# Patient Record
Sex: MALE | Race: WHITE
[De-identification: names, ages, dates, MRNs, and addresses within clinical notes are randomized per-mention and may not be internally consistent; named-entity substitution may affect disease eponyms.]

---

## 2020-02-03 ENCOUNTER — HOSPITAL ENCOUNTER (OUTPATIENT)
Dept: HOSPITAL 47 - EC | Age: 50
Setting detail: OBSERVATION
LOS: 3 days | Discharge: HOME HEALTH | End: 2020-02-06
Attending: HOSPITALIST | Admitting: HOSPITALIST
Payer: COMMERCIAL

## 2020-02-03 DIAGNOSIS — F32.9: ICD-10-CM

## 2020-02-03 DIAGNOSIS — Z71.6: ICD-10-CM

## 2020-02-03 DIAGNOSIS — E78.5: ICD-10-CM

## 2020-02-03 DIAGNOSIS — G89.29: ICD-10-CM

## 2020-02-03 DIAGNOSIS — F17.210: ICD-10-CM

## 2020-02-03 DIAGNOSIS — Z88.0: ICD-10-CM

## 2020-02-03 DIAGNOSIS — B95.61: ICD-10-CM

## 2020-02-03 DIAGNOSIS — Z79.899: ICD-10-CM

## 2020-02-03 DIAGNOSIS — F51.04: ICD-10-CM

## 2020-02-03 DIAGNOSIS — M00.072: Primary | ICD-10-CM
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
## 2020-02-03 DIAGNOSIS — M06.9: ICD-10-CM

## 2020-02-03 DIAGNOSIS — E66.9: ICD-10-CM

## 2020-02-03 LAB
ALBUMIN SERPL-MCNC: 3.9 G/DL (ref 3.5–5)
ALP SERPL-CCNC: 59 U/L (ref 38–126)
ALT SERPL-CCNC: 23 U/L (ref 4–49)
ANION GAP SERPL CALC-SCNC: 4 MMOL/L
APTT BLD: 20.3 SEC (ref 22–30)
AST SERPL-CCNC: 24 U/L (ref 17–59)
BASOPHILS # BLD AUTO: 0.1 K/UL (ref 0–0.2)
BASOPHILS NFR BLD AUTO: 1 %
BUN SERPL-SCNC: 13 MG/DL (ref 9–20)
CALCIUM SPEC-MCNC: 9.7 MG/DL (ref 8.4–10.2)
CHLORIDE SERPL-SCNC: 105 MMOL/L (ref 98–107)
CO2 SERPL-SCNC: 31 MMOL/L (ref 22–30)
EOSINOPHIL # BLD AUTO: 0.1 K/UL (ref 0–0.7)
EOSINOPHIL NFR BLD AUTO: 1 %
ERYTHROCYTE [DISTWIDTH] IN BLOOD BY AUTOMATED COUNT: 4.05 M/UL (ref 4.3–5.9)
ERYTHROCYTE [DISTWIDTH] IN BLOOD: 13.2 % (ref 11.5–15.5)
GLUCOSE SERPL-MCNC: 93 MG/DL (ref 74–99)
HCT VFR BLD AUTO: 38.6 % (ref 39–53)
HGB BLD-MCNC: 12.4 GM/DL (ref 13–17.5)
INR PPP: 0.9 (ref ?–1.2)
LYMPHOCYTES # SPEC AUTO: 0.8 K/UL (ref 1–4.8)
LYMPHOCYTES NFR SPEC AUTO: 10 %
MCH RBC QN AUTO: 30.7 PG (ref 25–35)
MCHC RBC AUTO-ENTMCNC: 32.2 G/DL (ref 31–37)
MCV RBC AUTO: 95.3 FL (ref 80–100)
MONOCYTES # BLD AUTO: 0.5 K/UL (ref 0–1)
MONOCYTES NFR BLD AUTO: 6 %
NEUTROPHILS # BLD AUTO: 6.7 K/UL (ref 1.3–7.7)
NEUTROPHILS NFR BLD AUTO: 81 %
PLATELET # BLD AUTO: 396 K/UL (ref 150–450)
POTASSIUM SERPL-SCNC: 4.5 MMOL/L (ref 3.5–5.1)
PROT SERPL-MCNC: 6.9 G/DL (ref 6.3–8.2)
PT BLD: 9.7 SEC (ref 9–12)
SODIUM SERPL-SCNC: 140 MMOL/L (ref 137–145)
WBC # BLD AUTO: 8.2 K/UL (ref 3.8–10.6)

## 2020-02-03 PROCEDURE — 96367 TX/PROPH/DG ADDL SEQ IV INF: CPT

## 2020-02-03 PROCEDURE — 96365 THER/PROPH/DIAG IV INF INIT: CPT

## 2020-02-03 PROCEDURE — 80048 BASIC METABOLIC PNL TOTAL CA: CPT

## 2020-02-03 PROCEDURE — 36415 COLL VENOUS BLD VENIPUNCTURE: CPT

## 2020-02-03 PROCEDURE — 87040 BLOOD CULTURE FOR BACTERIA: CPT

## 2020-02-03 PROCEDURE — 85652 RBC SED RATE AUTOMATED: CPT

## 2020-02-03 PROCEDURE — 85730 THROMBOPLASTIN TIME PARTIAL: CPT

## 2020-02-03 PROCEDURE — 73610 X-RAY EXAM OF ANKLE: CPT

## 2020-02-03 PROCEDURE — 96374 THER/PROPH/DIAG INJ IV PUSH: CPT

## 2020-02-03 PROCEDURE — 96376 TX/PRO/DX INJ SAME DRUG ADON: CPT

## 2020-02-03 PROCEDURE — 86140 C-REACTIVE PROTEIN: CPT

## 2020-02-03 PROCEDURE — 85610 PROTHROMBIN TIME: CPT

## 2020-02-03 PROCEDURE — 96366 THER/PROPH/DIAG IV INF ADDON: CPT

## 2020-02-03 PROCEDURE — 84145 PROCALCITONIN (PCT): CPT

## 2020-02-03 PROCEDURE — 99284 EMERGENCY DEPT VISIT MOD MDM: CPT

## 2020-02-03 PROCEDURE — 96375 TX/PRO/DX INJ NEW DRUG ADDON: CPT

## 2020-02-03 PROCEDURE — 36573 INSJ PICC RS&I 5 YR+: CPT

## 2020-02-03 PROCEDURE — 96372 THER/PROPH/DIAG INJ SC/IM: CPT

## 2020-02-03 PROCEDURE — 80053 COMPREHEN METABOLIC PANEL: CPT

## 2020-02-03 PROCEDURE — 85025 COMPLETE CBC W/AUTO DIFF WBC: CPT

## 2020-02-03 PROCEDURE — 96361 HYDRATE IV INFUSION ADD-ON: CPT

## 2020-02-03 PROCEDURE — 83605 ASSAY OF LACTIC ACID: CPT

## 2020-02-03 RX ADMIN — MORPHINE SULFATE PRN MG: 4 INJECTION, SOLUTION INTRAMUSCULAR; INTRAVENOUS at 19:30

## 2020-02-03 RX ADMIN — CEFAZOLIN SCH MLS/HR: 330 INJECTION, POWDER, FOR SOLUTION INTRAMUSCULAR; INTRAVENOUS at 14:53

## 2020-02-03 RX ADMIN — CEFAZOLIN SCH MLS/HR: 330 INJECTION, POWDER, FOR SOLUTION INTRAMUSCULAR; INTRAVENOUS at 21:28

## 2020-02-03 NOTE — ED
General Adult HPI





- General


Chief complaint: Extremity Injury, Lower


Stated complaint: bacterial infection rt ankle


Time Seen by Provider: 02/03/20 13:55


Source: patient, RN notes reviewed


Mode of arrival: ambulatory


Limitations: no limitations





- History of Present Illness


Initial comments: 





Patient is a pleasant 49-year-old male presenting to the emergency Department 

with right ankle pain and swelling.  Patient has had symptoms intermittently 

over the past couple of months.  Patient did see a rheumatologist and had 

aspiration done.  Patient was called today and told that there was infection and

to come to the emergency department.  Patient states discomfort is moderate at 

this time.  Patient denies any fevers.  Patient denies any redness.  No other 

area of involvement.





- Related Data


                                    Allergies











Allergy/AdvReac Type Severity Reaction Status Date / Time


 


Penicillins Allergy  Unknown Verified 02/03/20 13:26





   Childhood  














Review of Systems


ROS Statement: 


Those systems with pertinent positive or pertinent negative responses have been 

documented in the HPI.





ROS Other: All systems not noted in ROS Statement are negative.


Constitutional: Denies: fever


Eyes: Denies: eye pain


ENT: Denies: ear pain


Respiratory: Denies: cough


Cardiovascular: Denies: chest pain


Endocrine: Denies: fatigue


Gastrointestinal: Denies: abdominal pain


Genitourinary: Denies: dysuria


Musculoskeletal: Reports: as per HPI, arthralgia.  Denies: back pain


Skin: Reports: as per HPI





Past Medical History


Past Medical History: Rheumatoid Arthritis (RA)


History of Any Multi-Drug Resistant Organisms: None Reported


Past Surgical History: No Surgical Hx Reported


Past Psychological History: No Psychological Hx Reported


Smoking Status: Current every day smoker


Past Alcohol Use History: None Reported


Past Drug Use History: None Reported





General Exam


Limitations: no limitations


General appearance: alert, in no apparent distress


Head exam: Present: normocephalic


Eye exam: Present: normal appearance, PERRL


ENT exam: Present: normal oropharynx


Neck exam: Present: normal inspection


Respiratory exam: Present: normal lung sounds bilaterally


Cardiovascular Exam: Present: regular rate, normal rhythm


Extremities exam: Present: other (Right ankle with moderate swelling extending 

somewhat to the lateral foot.  There is also mild to moderate tenderness and 

mild warmth.  No erythema.)


Neurological exam: Present: alert.  Absent: motor sensory deficit


Skin exam: Present: normal color.  Absent: rash, erythema





Course





                                   Vital Signs











  02/03/20





  13:26


 


Temperature 98.5 F


 


Pulse Rate 81


 


Respiratory 18





Rate 


 


Blood Pressure 163/99


 


O2 Sat by Pulse 99





Oximetry 














Medical Decision Making





- Medical Decision Making





Patient reevaluated and updated.  Case was discussed with Dr. Waddell, who will 

admit for Dr. Mackay.  IV antibiotics will be started based off culture results

for Staphylococcus aureus





- Lab Data


Result diagrams: 


                                 02/03/20 14:48





                                 02/03/20 14:48





                                   Lab Results











  02/03/20 02/03/20 02/03/20 Range/Units





  14:48 14:48 14:48 


 


WBC  8.2    (3.8-10.6)  k/uL


 


RBC  4.05 L    (4.30-5.90)  m/uL


 


Hgb  12.4 L    (13.0-17.5)  gm/dL


 


Hct  38.6 L    (39.0-53.0)  %


 


MCV  95.3    (80.0-100.0)  fL


 


MCH  30.7    (25.0-35.0)  pg


 


MCHC  32.2    (31.0-37.0)  g/dL


 


RDW  13.2    (11.5-15.5)  %


 


Plt Count  396    (150-450)  k/uL


 


Neutrophils %  81    %


 


Lymphocytes %  10    %


 


Monocytes %  6    %


 


Eosinophils %  1    %


 


Basophils %  1    %


 


Neutrophils #  6.7    (1.3-7.7)  k/uL


 


Lymphocytes #  0.8 L    (1.0-4.8)  k/uL


 


Monocytes #  0.5    (0-1.0)  k/uL


 


Eosinophils #  0.1    (0-0.7)  k/uL


 


Basophils #  0.1    (0-0.2)  k/uL


 


Sodium   140   (137-145)  mmol/L


 


Potassium   4.5   (3.5-5.1)  mmol/L


 


Chloride   105   ()  mmol/L


 


Carbon Dioxide   31 H   (22-30)  mmol/L


 


Anion Gap   4   mmol/L


 


BUN   13   (9-20)  mg/dL


 


Creatinine   0.81   (0.66-1.25)  mg/dL


 


Est GFR (CKD-EPI)AfAm   >90   (>60 ml/min/1.73 sqM)  


 


Est GFR (CKD-EPI)NonAf   >90   (>60 ml/min/1.73 sqM)  


 


Glucose   93   (74-99)  mg/dL


 


Plasma Lactic Acid Jaime    1.5  (0.7-2.0)  mmol/L


 


Calcium   9.7   (8.4-10.2)  mg/dL


 


Total Bilirubin   0.3   (0.2-1.3)  mg/dL


 


AST   24   (17-59)  U/L


 


ALT   23   (4-49)  U/L


 


Alkaline Phosphatase   59   ()  U/L


 


Total Protein   6.9   (6.3-8.2)  g/dL


 


Albumin   3.9   (3.5-5.0)  g/dL














Disposition


Clinical Impression: 


 Septic arthritis





Disposition: ADMITTED AS IP TO THIS HOSP


Is patient prescribed a controlled substance at d/c from ED?: No


Referrals: 


Remington Mitchell DO [Primary Care Provider] - 1-2 days


Decision Time: 15:23

## 2020-02-03 NOTE — CONS
CONSULTATION



DATE OF SERVICE:

02/03/2020



REASON FOR CONSULTATION:

Right ankle septic arthritis.



HISTORY OF PRESENT ILLNESS:

The patient is a 49-year-old  male with a past medical history significant for

chronic pain to his right ankle area, for which the patient has been under care of his

rheumatologist.  The patient did have a history of rheumatoid arthritis as well. The

ankle pain has been going on for the last few weeks, and previously he did have an

aspirate and injection.  The patient also had repeat aspiration and injection on

Friday.  Apparently the fluid was slightly cloudy this time.  The patient did receive a

call from his rheumatologist that he needed to go to the hospital, as the cultures came

back positive with Staph with concern for right ankle septic arthritis.  The patient

has been complaining of pain to the right ankle area, more dull aching to sharp, about

6 to 7 out of 10, and no radiation.  The ankle is slightly swollen but there is no

redness.  The patient denies having a fever.  No nausea, no vomiting.  No abdominal

pain. No diarrhea. On arrival in the ER, the patient has been afebrile.  The patient's

white count is normal at 8.2.  The patient did have blood cultures drawn.  He was

started on Rocephin and admitted to the hospital. Infectious Disease was consulted for

further recommendations regarding antibiotic therapy.



REVIEW OF SYSTEMS:

Positive points have been mentioned in HPI.  Rest of the systems are negative.



PAST MEDICAL HISTORY:

Rheumatoid arthritis.



PAST SURGICAL HISTORY:

No major surgery.



SOCIAL HISTORY:

Current everyday smoker.  No drinking or any drug use.



FAMILY HISTORY:

No pertinent findings noticed.



ALLERGIES:

PENICILLIN with a childhood reaction.



MEDICATIONS:

The patient is currently on Rocephin 1 gram q.24 hours. He is on Tylenol, morphine

sulfate, Narcan and Ultram.



PHYSICAL EXAMINATION:

Blood pressure is 137/96, pulse of 74, temperature 98.3. He is 92% on room air.

General description is a middle-aged male lying in bed in no distress.  No tachypnea or

accessory muscle of respiration use.

HEENT examination shows pallor. No scleral icterus.  Oral mucosa membrane is dry.  No

pharyngeal erythema or thrush.

NECK: Trachea is central. No thyromegaly.

LUNGS: Unlabored breathing. Clear to auscultation anteriorly.  No wheeze or crackle.

HEART: S1, S2.  Regular rate and rhythm.

ABDOMEN: Soft. No tenderness. No guarding or rigidity.

EXTREMITIES: No edema of the feet. Right knee slightly swollen and tender to touch.  No

significant redness was noticed.



LABS:

Hemoglobin is 12.4, white count 8.2, BUN of 13, creatinine 0.81.



DIAGNOSTIC IMPRESSION AND PLAN:

Patient with a positive culture from the right ankle area with Staphylococcus aureus

with concern for septic arthritis in this patient who did have pain to the right ankle

and has been injected in the past as well. Possible risk factor.



PLAN:

1. Blood cultures have been obtained.  Those will be followed to make sure patient is

    not bacteremic.

2. We will check a CRP and a sed rate and check an x-ray of the right ankle area.

3. Discontinue Rocephin and start the patient on cefazolin 2 grams q.8 hours.

4. We will follow up on his clinical condition and culture to further adjust

    medication if needed.

Thank you for this consultation.  Will follow this patient along with you.





MMSADIQ / MEJIAN: [id number] / Job#: [id number]

## 2020-02-04 LAB
ANION GAP SERPL CALC-SCNC: 4 MMOL/L
BASOPHILS # BLD AUTO: 0 K/UL (ref 0–0.2)
BASOPHILS NFR BLD AUTO: 1 %
BUN SERPL-SCNC: 13 MG/DL (ref 9–20)
CALCIUM SPEC-MCNC: 9 MG/DL (ref 8.4–10.2)
CHLORIDE SERPL-SCNC: 106 MMOL/L (ref 98–107)
CO2 SERPL-SCNC: 29 MMOL/L (ref 22–30)
EOSINOPHIL # BLD AUTO: 0.1 K/UL (ref 0–0.7)
EOSINOPHIL NFR BLD AUTO: 1 %
ERYTHROCYTE [DISTWIDTH] IN BLOOD BY AUTOMATED COUNT: 3.77 M/UL (ref 4.3–5.9)
ERYTHROCYTE [DISTWIDTH] IN BLOOD: 13.3 % (ref 11.5–15.5)
GLUCOSE SERPL-MCNC: 73 MG/DL (ref 74–99)
HCT VFR BLD AUTO: 36.6 % (ref 39–53)
HGB BLD-MCNC: 11.3 GM/DL (ref 13–17.5)
LYMPHOCYTES # SPEC AUTO: 1.2 K/UL (ref 1–4.8)
LYMPHOCYTES NFR SPEC AUTO: 20 %
MCH RBC QN AUTO: 30 PG (ref 25–35)
MCHC RBC AUTO-ENTMCNC: 30.8 G/DL (ref 31–37)
MCV RBC AUTO: 97.1 FL (ref 80–100)
MONOCYTES # BLD AUTO: 0.4 K/UL (ref 0–1)
MONOCYTES NFR BLD AUTO: 7 %
NEUTROPHILS # BLD AUTO: 4.1 K/UL (ref 1.3–7.7)
NEUTROPHILS NFR BLD AUTO: 70 %
PLATELET # BLD AUTO: 349 K/UL (ref 150–450)
POTASSIUM SERPL-SCNC: 4.7 MMOL/L (ref 3.5–5.1)
SODIUM SERPL-SCNC: 139 MMOL/L (ref 137–145)
WBC # BLD AUTO: 5.9 K/UL (ref 3.8–10.6)

## 2020-02-04 RX ADMIN — ESCITALOPRAM OXALATE SCH MG: 20 TABLET, FILM COATED ORAL at 11:30

## 2020-02-04 RX ADMIN — NICOTINE SCH PATCH: 14 PATCH, EXTENDED RELEASE TRANSDERMAL at 14:19

## 2020-02-04 RX ADMIN — ATORVASTATIN CALCIUM SCH MG: 10 TABLET, FILM COATED ORAL at 11:28

## 2020-02-04 RX ADMIN — MORPHINE SULFATE PRN MG: 4 INJECTION, SOLUTION INTRAMUSCULAR; INTRAVENOUS at 14:14

## 2020-02-04 RX ADMIN — MORPHINE SULFATE PRN MG: 4 INJECTION, SOLUTION INTRAMUSCULAR; INTRAVENOUS at 01:43

## 2020-02-04 RX ADMIN — MORPHINE SULFATE PRN MG: 4 INJECTION, SOLUTION INTRAMUSCULAR; INTRAVENOUS at 07:55

## 2020-02-04 RX ADMIN — CEFAZOLIN SCH MLS/HR: 330 INJECTION, POWDER, FOR SOLUTION INTRAMUSCULAR; INTRAVENOUS at 11:25

## 2020-02-04 RX ADMIN — CEFAZOLIN SCH MLS/HR: 330 INJECTION, POWDER, FOR SOLUTION INTRAMUSCULAR; INTRAVENOUS at 01:43

## 2020-02-04 RX ADMIN — MORPHINE SULFATE PRN MG: 4 INJECTION, SOLUTION INTRAMUSCULAR; INTRAVENOUS at 18:43

## 2020-02-04 RX ADMIN — ASPIRIN SCH: 325 TABLET ORAL at 12:52

## 2020-02-04 RX ADMIN — BUPROPION HYDROCHLORIDE SCH MG: 300 TABLET, FILM COATED, EXTENDED RELEASE ORAL at 11:30

## 2020-02-04 NOTE — P.HPIM
History of Present Illness


H&P Date: 02/04/20


Chief Complaint: Right ankle pain





History of presenting complaint:


This is a very pleasant 49-year-old patient of Remington BUCKLEYMerced.  Chronic stable 

medical conditions include hyperlipidemia, depression, insomnia.  Patient 

follows with Dr. Andrade-rheumatologist.  Has a diagnosis of rheumatoid 

arthritis.  That affects primarily his shoulders hands and ankles.  Patient's 

had trouble with the right ankle for some time.  And has received a total of at 

least 10 steroid injections for the same.  Including 2 this  year.  Patient had 

injury to the left leg with a brace and was relatively  not weightbearing.  

Patient had been putting weight on the right lower extremity and the right 

ankle.  Patient now again presents with increasing pain in the right ankle.  

Denies any fever chills nausea vomiting.  The joint was tapped by Dr. Andrade 

staph aureus was grown and patient was sent in for the same.  Initially started 

on IV ceftriaxone and then switched over to IV Ancef by Dr. Manzano from 

infectious disease.  Appetite is good otherwise no nausea vomiting.





Review of systems:


GEN.:  Tired


EYES: None


HEENT: None


NECK: None


RESPIRATORY: None


CARDIOVASCULAR: None


GASTROINTESTINAL: None


GENITOURINARY: None


MUSCULOSKELETAL: As above


LYMPHATICS: None


HEMATOLOGICAL: None  


PSYCHIATRY: None


NEUROLOGICAL: Trouble sleeping





Past medical history to include:


Hyperlipidemia, depression, insomnia, rheumatoid arthritis





Social history:


Smokes about a pack a day, lives with her son.  Does construction work.  Alcohol

occasionally.





Family history:


Reviewed, noncontributory to presentation





Physical examination:


VITAL SIGNS: 98.5, 81, 18, 163/99, 99% room air


GENERAL: BMI 33.5, sitting up in bed, not in distress.


EYES: Pupils equal.  Conjunctiva normal.


HEENT: External appearance of nose and ears normal, oral cavity grossly normal.


NECK: JVD not raised; masses not palpable.


HEART: First and second heart sounds are normal;  no edema.  


LUNGS: Respiratory rate normal; clear to auscultation.  


ABDOMEN: Soft,  nontender, liver spleen not palpable, no masses palpable.  


PSYCH: Alert and oriented x3;  mood  and affect normal.


MUSCULOSKELETAL: Right ankle is somewhat swollen compared to the left, with no 

bony prominences and disfigurement, some local tenderness  


NEUROLOGICAL: Cranial nerves grossly intact; no facial asymmetry,   power and 

sensation grossly intact. 


LYMPHATICS: No lymph nodes palpable in the axilla and neck





INVESTIGATIONS, reviewed in the clinical context:


Culture results for the right ankle from Dr. Andrade's office show staph aureus


White count 8.2 hemoglobin 12.4 potassium 4.5 creatinine 0.81


C-reactive protein less than 5


X-ray right ankle-July soft tissue swelling and decrease the joint space





Assessment:


-This is a patient who has a diagnosis of rheumatoid arthritis and has been 

having tonic pain in the right ankle for quite some time.  Patient received a 

total of approximately 10 steroid injections of the same.  Including 2 this 

year.  Patient recently had injury to his left ankle and as a result was 

weightbearing more on the right ankle.  Presented to his rheumatologist 

increasing pain and swelling.  Add was tapped his chronic staph aureus.  His 

denies any fever and chills.  Interestingly CRP is only less than 5.  No 

leukocytosis.


-Hyperlipidemia


-Depression otherwise specified


-Chronic insomnia


-Obesity BMI 33.5





Plan:


Patient started on IV Ancef.  Home medications are to continue.  Presented fluid

between the form of naproxen.  Lovenox for DVT prophylaxis.  Consultations made 

to ID and rheumatology.  Care was discussed at length question were answered.





Past Medical History


Past Medical History: Rheumatoid Arthritis (RA)


History of Any Multi-Drug Resistant Organisms: None Reported


Past Surgical History: No Surgical Hx Reported


Past Psychological History: No Psychological Hx Reported


Smoking Status: Current every day smoker


Past Alcohol Use History: None Reported


Past Drug Use History: None Reported





Medications and Allergies


                                Home Medications











 Medication  Instructions  Recorded  Confirmed  Type


 


Atorvastatin [Lipitor] 10 mg PO DAILY 02/03/20 02/03/20 History


 


DULoxetine HCL [Cymbalta] See Taper PO BID 02/03/20 02/03/20 History


 


Escitalopram [Lexapro] 20 mg PO DAILY 02/03/20 02/03/20 History


 


HYDROcodone/APAP 10-325MG [Norco 1 tab PO Q4HR PRN 02/03/20 02/03/20 History





]    


 


Tofacitinib Citrate [Xeljanz Xr] 11 mg PO DAILY 02/03/20 02/03/20 History


 


buPROPion HCL [Wellbutrin XL] 300 mg PO DAILY 02/03/20 02/03/20 History


 


clonazePAM [KlonoPIN] 1 mg PO BID PRN 02/03/20 02/03/20 History


 


predniSONE See Taper PO BID 02/03/20 02/03/20 History








                                    Allergies











Allergy/AdvReac Type Severity Reaction Status Date / Time


 


Penicillins Allergy  Unknown Verified 02/03/20 16:39





   Childhood  














Physical Exam


Vitals: 


                                   Vital Signs











  Temp Pulse Pulse Resp BP BP Pulse Ox


 


 02/04/20 07:45  97.7 F   68  16   144/98 


 


 02/04/20 05:00  97.7 F   64  18   139/94  97


 


 02/03/20 23:30     18   


 


 02/03/20 21:00  98.3 F   74  18   137/86  92 L


 


 02/03/20 17:13  98.5 F   60  20   162/95  97


 


 02/03/20 16:15   78   20  153/99   96


 


 02/03/20 16:00   78  60  20  153/99   96


 


 02/03/20 15:00   85   20  155/98   96


 


 02/03/20 14:30     20    96


 


 02/03/20 13:30     20    97


 


 02/03/20 13:26  98.5 F  81   18  163/99   99








                                Intake and Output











 02/03/20 02/04/20 02/04/20





 22:59 06:59 14:59


 


Intake Total 1640 640 480


 


Balance 1640 640 480


 


Intake:   


 


  Intake, IV Titration 920 400 





  Amount   


 


    Sodium Chloride 0.9% 1, 920  





    000 ml @ 130 mls/hr IV .   





    Q7H42M [facility] Rx#:[id number]   


 


    ceFAZolin 2 gm In Sodium  400 





    Chloride 0.9% 50 ml @ 100   





    mls/hr IVPB Q8H [facility] Rx#:   





    [id number]   


 


  Oral 720 240 480


 


Other:   


 


  # Voids 1 1 


 


  Weight 108.862 kg  














Results


CBC & Chem 7: 


                                 02/04/20 07:11





                                 02/04/20 07:11


Labs: 


                  Abnormal Lab Results - Last 24 Hours (Table)











  02/03/20 02/03/20 02/03/20 Range/Units





  14:48 14:48 14:48 


 


RBC  4.05 L    (4.30-5.90)  m/uL


 


Hgb  12.4 L    (13.0-17.5)  gm/dL


 


Hct  38.6 L    (39.0-53.0)  %


 


MCHC     (31.0-37.0)  g/dL


 


Lymphocytes #  0.8 L    (1.0-4.8)  k/uL


 


APTT    20.3 L  (22.0-30.0)  sec


 


Carbon Dioxide   31 H   (22-30)  mmol/L


 


Glucose     (74-99)  mg/dL














  02/04/20 02/04/20 Range/Units





  07:11 07:11 


 


RBC  3.77 L   (4.30-5.90)  m/uL


 


Hgb  11.3 L   (13.0-17.5)  gm/dL


 


Hct  36.6 L   (39.0-53.0)  %


 


MCHC  30.8 L   (31.0-37.0)  g/dL


 


Lymphocytes #    (1.0-4.8)  k/uL


 


APTT    (22.0-30.0)  sec


 


Carbon Dioxide    (22-30)  mmol/L


 


Glucose   73 L  (74-99)  mg/dL














Thrombosis Risk Factor Assmnt





- Choose All That Apply


Any of the Below Risk Factors Present?: No

## 2020-02-04 NOTE — PN
PROGRESS NOTE



DATE OF SERVICE:

02/04/2020.



REASON FOR FOLLOWUP:

Right ankle septic arthritis.



INTERVAL HISTORY:

The patient is currently afebrile.  The patient has been breathing comfortably.  The

patient denies having any chest pain or shortness of breath or cough.  No nausea,

vomiting.  The pain to the right leg area is currently controlled.



PHYSICAL EXAMINATION:

Blood pressure is 159/81 with a pulse of 63, temperature 98.1. He is 96% on room air.

General description is a middle-aged male lying in bed in no distress.

RESPIRATORY SYSTEM: Unlabored breathing. Clear to auscultation anteriorly.

HEART: S1, S2.  Regular rate and rhythm.

ABDOMEN: Soft. No tenderness.

Right ankle still has minimal swelling and is tender to touch. No redness or any

drainage.



LABS/IMAGING:

White count 5.9 with a creatinine 0.84.  CRP is less than 5. Sed rate is 28.



X-ray shows some fluid in the ankle joint, but no bony changes.



DIAGNOSTIC IMPRESSION AND PLAN:

Patient admitted to hospital with a positive culture from his right ankle done as an

outpatient that has been positive for MSSA.  The patient is currently afebrile. White

count normal.  Sedimentation rate is slightly elevated.  The patient is covered with

cefazolin 2 grams q.8 hours. We are waiting for the outpatient IV antibiotic coverage

before discharge.  is working on it. Continue with supportive care.





MMODL / MEJIAN: [id number] / Job#: [id number]

## 2020-02-04 NOTE — XR
EXAMINATION TYPE: XR ankle complete RT

 

DATE OF EXAM: 2/4/2020

 

COMPARISON: NONE

 

HISTORY: 49-year-old male pain, septic arthritis

 

TECHNIQUE: 3 views

 

FINDINGS: 

Generalized soft tissue swelling. Underlying joint effusion. There is eccentric narrowing along the l
ateral aspect of the tibiotalar joint. Some spurring from the dorsal talar head/neck region. No peria
rticular osteopenia. No acute fracture, subluxation, or dislocation.

 

IMPRESSION: 

Generalized soft tissue swelling and underlying joint effusion. No periarticular osteopenia which is 
often seen with septic arthritis. However, there is eccentric joint space narrowing along the lateral
 tibiotalar joint which is abnormal. Arthrocentesis can be considered if concern for septic arthritis
.

## 2020-02-05 LAB
INR PPP: 1 (ref ?–1.2)
PT BLD: 10 SEC (ref 9–12)

## 2020-02-05 RX ADMIN — ENOXAPARIN SODIUM SCH MG: 40 INJECTION SUBCUTANEOUS at 09:39

## 2020-02-05 RX ADMIN — ESCITALOPRAM OXALATE SCH MG: 20 TABLET, FILM COATED ORAL at 09:40

## 2020-02-05 RX ADMIN — MORPHINE SULFATE PRN MG: 4 INJECTION, SOLUTION INTRAMUSCULAR; INTRAVENOUS at 05:48

## 2020-02-05 RX ADMIN — CEFAZOLIN SCH MLS/HR: 330 INJECTION, POWDER, FOR SOLUTION INTRAMUSCULAR; INTRAVENOUS at 11:26

## 2020-02-05 RX ADMIN — ATORVASTATIN CALCIUM SCH MG: 10 TABLET, FILM COATED ORAL at 09:40

## 2020-02-05 RX ADMIN — BUPROPION HYDROCHLORIDE SCH MG: 300 TABLET, FILM COATED, EXTENDED RELEASE ORAL at 09:39

## 2020-02-05 RX ADMIN — CEFAZOLIN SCH MLS/HR: 330 INJECTION, POWDER, FOR SOLUTION INTRAMUSCULAR; INTRAVENOUS at 17:02

## 2020-02-05 RX ADMIN — ASPIRIN SCH: 325 TABLET ORAL at 12:15

## 2020-02-05 RX ADMIN — MORPHINE SULFATE PRN MG: 4 INJECTION, SOLUTION INTRAMUSCULAR; INTRAVENOUS at 11:21

## 2020-02-05 RX ADMIN — NICOTINE SCH PATCH: 14 PATCH, EXTENDED RELEASE TRANSDERMAL at 09:39

## 2020-02-05 RX ADMIN — MORPHINE SULFATE PRN MG: 4 INJECTION, SOLUTION INTRAMUSCULAR; INTRAVENOUS at 21:54

## 2020-02-05 RX ADMIN — CEFAZOLIN SCH MLS/HR: 330 INJECTION, POWDER, FOR SOLUTION INTRAMUSCULAR; INTRAVENOUS at 03:40

## 2020-02-05 RX ADMIN — CEFAZOLIN SCH MLS/HR: 330 INJECTION, POWDER, FOR SOLUTION INTRAMUSCULAR; INTRAVENOUS at 19:59

## 2020-02-05 RX ADMIN — MORPHINE SULFATE PRN MG: 4 INJECTION, SOLUTION INTRAMUSCULAR; INTRAVENOUS at 16:59

## 2020-02-05 NOTE — PN
PROGRESS NOTE



DATE OF SERVICE:

02/05/2020



REASON FOR FOLLOWUP:

Right ankle septic arthritis with MSSA.



INTERVAL HISTORY:

The patient is currently afebrile, has been breathing comfortably.  Pain to the right

ankle is currently controlled.  No worsening.  Denies any chest pain, shortness of

breath, cough, no nausea, vomiting, abdominal pain, no diarrhea.



PHYSICAL EXAMINATION:

Blood pressure 149/93 with a pulse of 77, temperature 98.2.  He is 95% on room air.

General description is a middle-aged male lying in bed in no distress.  Respiratory

system: Unlabored breathing.  Clear to auscultation anteriorly.  Heart S1, S2 regular

rate and rhythm.  Abdomen soft, no tenderness.  Right ankle minimal swelling.  No

redness, no drainage.



LABS:

INR is 1.0.  Blood culture has been negative so far.



DIAGNOSTIC IMPRESSION AND PLAN:

Patient with right knee aspirate culture positive for MSSA in this patient who did have

a previous injection of the right ankle though he did not have too many features of

septic arthritis.  However, the cultures could not be disregarded even he is currently

covered with cefazolin 2 g daily to continue.  Plan for at least 4 weeks of antibiotic

and monitor clinical course closely.  Continue supportive care.





MMODL / IJN: [id number] / Job#: [id number]

## 2020-02-05 NOTE — P.CON
Consult Note





- .


Consult date: 02/04/20


Assessment/Plan:: 





I've seen the patient in the office for his current problem. He sees me for 

treatment of RA and he is currently on Xeljanz 11 mg daily through me. 





Patient has been having this right ankle pain, redness, and swelling for some 

time. He stated he was having these symptoms prior to Thanksgiving.  Patient 

stated he went deer hunting and took a Medrol Dosepak which helped for a couple 

of days although symptoms returned.  Patient stated while deer hunting he was 

drinking 2 beers daily although denies alcohol use since.  Patient also did 

mention that he tripped and fell and hurt his right ankle around Thanksgiving as

well.  





He presented on 1/10/2020 complaining of worsening joint pain and swelling of 

his right foot/ankle.  On exam he did have significant swelling, erythema, and 

warmth to his right foot/ankle. Ultrasound revealed significant synovitis with 

4+ Doppler and patient received right ankle injection on 1/10/2020 and I also 

started patient on prednisone 30 mg two week taper. 





Labs performed on 1/10/2020 revealed a CBC with differential which showed low 

RBC count of 4.04, low hemoglobin at 12.9, low hematocrit at 37.7, an elevated 

absolute neutrophil 7.8, normal BMP, normal uric acid at 4.6, normal AST, normal

ALT, elevated CRP at 8.3, and elevated ESR at 55. Vectra DA on 1/10/2020 

revealed high disease activity of 80 which was up from 35 on 10/30/19. 





Patient placed a call on 1/15/2020 stating his ankle pain and swelling was much 

worse and he now had symptoms up to his knee and patient was told to go to ED 

for further evaluation.  Patient presented for follow-up on 1/24/2020 at which 

time he stated he did go to urgent care who performed ultrasound to rule out DVT

which was negative, they also performed x-ray of the left foot/ankle which was 

negative, and patient states they started him on Bactrim for possible 

cellulitis.  He started a ten-day course of Bactrim on 1/17/2020.  





MRI of the right foot/ankle performed on 1/28/2020 revealed a large ankle joint 

effusion although no destructive bone changes identified to suggest infection, 

mild to moderate arthritic changes at the ankle joint with no bony erosions or 

destructive bone changes, scattered mild degenerative changes are present 

elsewhere, intermediate grade anterior talofibular demented strain, absence of a

normal-appearing calcaneofibular ligament suggesting a ligament tear, mild to 

moderate fluid is present in the peroneal tendon sheath consistent with peroneal

tenosynovitis, slightly increased signal with both peroneal longus and brevis 

tendons consistent with tendinosis.





Patient came for follow-up again on 1/31/2022. Since MRI results did not 

correlate with his symptoms ankle aspiration was performed and 6 cc's of yellow,

cloudy fluid was obtained. 





Synovial fluid analysis of the right ankle revealed abnormal color of yellow, 

turbid appearance, [place] WBC count of 904[address], [place] of 09[address], PMNs as the 

predominant inflammatory cell, negative crystals, Gram stain revealed 4+ WBCs, 

1+ RBCs, 1+ gram-positive cocci, culture revealed 1+ Staphylococcus aureus, and 

no anaerobes isolated after 2 days. 





After receiving positive results patient was called and told to go to ER for 

possible septic joint.





Patient will need to stop Xeljanz as this is an immunsuppresent. Patient can 

follow up with me in the office after his discharge to discuss further plan of 

care. I will need to see him within 2 weeks of discharge.

## 2020-02-06 VITALS — HEART RATE: 78 BPM | DIASTOLIC BLOOD PRESSURE: 93 MMHG | SYSTOLIC BLOOD PRESSURE: 153 MMHG | TEMPERATURE: 98.6 F

## 2020-02-06 VITALS — RESPIRATION RATE: 16 BRPM

## 2020-02-06 PROCEDURE — 02HV33Z INSERTION OF INFUSION DEVICE INTO SUPERIOR VENA CAVA, PERCUTANEOUS APPROACH: ICD-10-PCS

## 2020-02-06 RX ADMIN — MORPHINE SULFATE PRN MG: 4 INJECTION, SOLUTION INTRAMUSCULAR; INTRAVENOUS at 09:19

## 2020-02-06 RX ADMIN — BUPROPION HYDROCHLORIDE SCH MG: 300 TABLET, FILM COATED, EXTENDED RELEASE ORAL at 08:19

## 2020-02-06 RX ADMIN — CEFAZOLIN SCH MLS/HR: 330 INJECTION, POWDER, FOR SOLUTION INTRAMUSCULAR; INTRAVENOUS at 04:21

## 2020-02-06 RX ADMIN — MORPHINE SULFATE PRN MG: 4 INJECTION, SOLUTION INTRAMUSCULAR; INTRAVENOUS at 04:18

## 2020-02-06 RX ADMIN — ENOXAPARIN SODIUM SCH: 40 INJECTION SUBCUTANEOUS at 07:24

## 2020-02-06 RX ADMIN — NICOTINE SCH PATCH: 14 PATCH, EXTENDED RELEASE TRANSDERMAL at 08:19

## 2020-02-06 RX ADMIN — ASPIRIN SCH: 325 TABLET ORAL at 07:24

## 2020-02-06 RX ADMIN — ATORVASTATIN CALCIUM SCH MG: 10 TABLET, FILM COATED ORAL at 08:19

## 2020-02-06 RX ADMIN — ESCITALOPRAM OXALATE SCH MG: 20 TABLET, FILM COATED ORAL at 08:19

## 2020-02-06 NOTE — PN
PROGRESS NOTE



DATE OF SERVICE:

02/06/2020



REASON FOR FOLLOWUP:

Right ankle septic arthritis, MSSA.



INTERVAL HISTORY:

The patient is currently afebrile, has been breathing comfortably.  The patient did

mention swelling to the right ankle and leg has decreased. He has more mobility and

less pain compared to when he came to the hospital.  Denies having any chest pain,

shortness of breath or cough. No abdominal pain or diarrhea.



PHYSICAL EXAMINATION:

Blood pressure is 153/93 with a pulse of 78, temperature 98.6.  He is 96% on room air.

General description is a middle-aged male lying in bed in no distress.

RESPIRATORY SYSTEM: Unlabored breathing, clear to auscultation anteriorly.  HEART: S1,

S2.  Regular rate and rhythm.

ABDOMEN:  Soft, no tenderness.

Right leg swelling has decreased.



LABS:

No new labs have been obtained today.  Blood culture has been negative.



DIAGNOSTIC IMPRESSION AND PLAN:

Patient with right _____septic arthritis. Outpatient culture positive for MSSA.  Blood

culture has been negative. _____ Patient will get a Midline/PICC line today with plan

for a total of 4 weeks of antibiotic therapy.  Continue supportive care.





MMODL / IJN: [id number] / Job#: [id number]

## 2020-02-06 NOTE — IR
PICC LINE PLACEMENT:

 

HISTORY:  Infection requiring long-term antibiotic therapy

 

PROCEDURE:  Ultrasound and fluoroscopic guidance of PICC line placement.

 

COMPLICATIONS:  None

 

ANESTHESIA:  1. 1% Lidocaine locally.

 

FINDINGS/TECHNIQUE:  The procedure was explained to the patient.  The risks, complications, benefits 
and alternatives were discussed and any questions were answered.  Informed consent was obtained.  The
 patient was placed supine on the fluoroscopic table and prepped and draped in the usual sterile [facility]
ion.  Utilizing a  21 gauge needle and sonographic and fluoroscopic guidance, access in the vein was 
achieved and there is placement of a 0.018 guidewire.  The vein is patent.  A 4-F sheath was placed o
wendi the guidewire.  The guidewire and dilator were removed and a 4-F. PICC line was placed through th
e sheath with the tip at the level of the SVC.  The sheath was removed, the catheter was flushed and 
sutured into position.  The patient was stable throughout the procedure and remained stable upon disc
harge from the Department of Radiology. 

 

The vein puncture was patent under ultrasound. A gray scale image was obtained to document patency of
 the vein punctured.

 

All elements of the maximal barrier technique were utilized.

 

FLUOROSCOPY TIME:  0.2 minutes, one image submitted

 

IMPRESSION: 

Successful PICC line placement under ultrasound and fluoroscopic guidance.

## 2020-02-06 NOTE — P.PN
Progress Note - Text


Progress Note Date: 02/05/20





Chief Complaint: Right ankle pain





History of presenting complaint:


This is a very pleasant 49-year-old patient of Remington BUCKLEYStockwell.  Chronic stable 

medical conditions include hyperlipidemia, depression, insomnia.  Patient 

follows with Dr. Andrade-rheumatologist.  Has a diagnosis of rheumatoid 

arthritis.  That affects primarily his shoulders hands and ankles.  Patient's 

had trouble with the right ankle for some time.  And has received a total of at 

least 10 steroid injections for the same.  Including 2 this  year.  Patient had 

injury to the left leg with a brace and was relatively  not weightbearing.  

Patient had been putting weight on the right lower extremity and the right 

ankle.  Patient now again presents with increasing pain in the right ankle.  

Denies any fever chills nausea vomiting.  The joint was tapped by Dr. Andrade 

staph aureus was grown and patient was sent in for the same.  Initially started 

on IV ceftriaxone and then switched over to IV Ancef by Dr. Manzano from 

infectious disease.  Appetite is good otherwise no nausea vomiting.


Today-sitting up feeling, reviewed.  Some decreased pain in the right ankle.  

Daughter the bedside.  No fever no chills.  Tolerating a diet.  PICC line has 

been ordered.





Review of systems: Was done for constitutional, cardiovascular, GI, pulmonary. 

relevant finding as above





Active Medications





Acetaminophen (Tylenol Tab)  650 mg PO Q6HR PRN


   PRN Reason: Mild Pain or Fever > 100.5


Atorvastatin Calcium (Lipitor)  10 mg PO DAILY [facility]


   Last Admin: 02/05/20 09:40 Dose:  10 mg


   Documented by: 


Bupropion HCl (Wellbutrin Xl)  300 mg PO DAILY [facility]


   Last Admin: 02/05/20 09:39 Dose:  300 mg


   Documented by: 


Clonazepam (Klonopin)  1 mg PO BID PRN


   PRN Reason: Anxiety


   Last Admin: 02/05/20 09:39 Dose:  1 mg


   Documented by: 


Enoxaparin Sodium (Lovenox)  40 mg SQ DAILY [facility]


   Last Admin: 02/05/20 09:39 Dose:  40 mg


   Documented by: 


Escitalopram Oxalate (Lexapro)  20 mg PO DAILY [facility]


   Last Admin: 02/05/20 09:40 Dose:  20 mg


   Documented by: 


Sodium Chloride (Saline 0.9%)  1,000 mls @ 130 mls/hr IV .Q7H42M [facility]


   Last Admin: 02/05/20 19:59 Dose:  130 mls/hr


   Documented by: 


Cefazolin Sodium 2 gm/ Sodium (Chloride)  50 mls @ 100 mls/hr IVPB Q8H [facility]


   Last Admin: 02/05/20 18:18 Dose:  100 mls/hr


   Documented by: 


Melatonin (Melatonin)  3 mg PO HS PRN


   PRN Reason: Insomnia


Morphine Sulfate (Morphine Sulfate (Inj))  4 mg IV Q4HR PRN


   PRN Reason: Severe Pain


   Last Admin: 02/05/20 21:54 Dose:  4 mg


   Documented by: 


Naloxone HCl (Narcan)  0.2 mg IV Q2M PRN


   PRN Reason: Opioid Reversal


Nicotine (Habitrol 14mg/24hr Patch)  1 patch TRANSDERM DAILY [facility]


   Last Admin: 02/05/20 09:39 Dose:  1 patch


   Documented by: 


Patient's Own ( Tofacitinib Citrate [Xeljanz Xr] 11 Mg)  11 mg PO DAILY [facility]


   Last Admin: 02/05/20 12:15 Dose:  Not Given


   Documented by: 


Tramadol HCl (Ultram)  50 mg PO Q6H PRN


   PRN Reason: Moderate Pain


   Last Admin: 02/05/20 19:57 Dose:  50 mg


   Documented by: 








Physical examination:


VITAL SIGNS: 98.2, 77, 16, 149/93, 95% on room air


GENERAL: BMI 33.5, sitting up in bed, not in distress.


EYES: Pupils equal.  Conjunctiva normal.


HEENT: External appearance of nose and ears normal, oral cavity grossly normal.


NECK: JVD not raised; masses not palpable.


HEART: First and second heart sounds are normal;  no edema.  


LUNGS: Respiratory rate normal; clear to auscultation.  


ABDOMEN: Soft,  nontender, liver spleen not palpable, no masses palpable.  


PSYCH: Alert and oriented x3;  mood  and affect normal.


MUSCULOSKELETAL: Right ankle is somewhat swollen compared to the left, with no 

bony prominences and disfigurement, some local tenderness  








INVESTIGATIONS, reviewed in the clinical context:


Culture results for the right ankle from Dr. Andrade's office show staph aureus


White count 8.2 hemoglobin 12.4 potassium 4.5 creatinine 0.81


C-reactive protein less than 5


X-ray right ankle-July soft tissue swelling and decrease the joint space


Pro calcitonin 0.02





Assessment:


-Acute right ankle septic arthritis growing staph aureus


-Hyperlipidemia


-Depression otherwise specified


-Chronic insomnia


-Obesity BMI 33.5





Plan:


on IV Ancef.  Discussed with Dr. Manzano from ID.  4 weeks of IV antibiotics.  

PICC line has been ordered.  Discussed with the patient.

## 2020-02-09 NOTE — P.DS
Providers


Date of admission: 


02/03/20 15:24





Expected date of discharge: 02/09/20


Attending physician: 


Agustin Waddell





Consults: 





                                        





02/03/20 15:24


Consult Physician Urgent 


   Consulting Provider: Saad Silva


   Consult Reason/Comments: Right ankle infection


   Do you want consulting provider notified?: Yes





02/04/20 11:13


Consult Physician Routine 


   Consulting Provider: Rachel Andrade


   Consult Reason/Comments: RA


   Do you want consulting provider notified?: Yes











Primary care physician: 


[facility] Course: 





Chief Complaint: Right ankle pain





History of presenting complaint:


This is a very pleasant 49-year-old patient of Remington Mackay.  Chronic stable 

medical conditions include hyperlipidemia, depression, insomnia.  Patient 

follows with Dr. Andrade-rheumatologist.  Has a diagnosis of rheumatoid 

arthritis.  That affects primarily his shoulders hands and ankles.  Patient's 

had trouble with the right ankle for some time.  And has received a total of at 

least 10 steroid injections for the same.  Including 2 this  year.  Patient had 

injury to the left leg with a brace and was relatively  not weightbearing.  

Patient had been putting weight on the right lower extremity and the right 

ankle.  Patient now again presents with increasing pain in the right ankle.  

Denies any fever chills nausea vomiting.  The joint was tapped by Dr. Andrade 

staph aureus was grown and patient was sent in for the same.  Initially started 

on IV ceftriaxone and then switched over to IV Ancef by Dr. Silva from 

infectious disease.  Appetite is good otherwise no nausea vomiting.


Today-the well.  Pain well controlled.  PICC line.  Home antibiotic being 

arranged.  Discussed with the patient.  Discussed the .  Discussed 

with Dr. silva from ID.  We will get 4 weeks of IV antibiotics.  

[facility]


Discussion and discharge planning more than 35 minutes





Consultation:


Dr. NOHEMY silva from ID


Dr. Andrade from rheumatology











Physical examination:


VITAL SIGNS: 98.6-78-/93, 96% room air


GENERAL: Sitting up, comfortable.


EYES: Pupils equal.  Conjunctiva normal.


HEENT: External appearance of nose and ears normal, oral cavity grossly normal.


NECK: JVD not raised; masses not palpable.


HEART: First and second heart sounds are normal;  no edema.  


LUNGS: Respiratory rate normal; clear to auscultation.  


ABDOMEN: Soft,  nontender, liver spleen not palpable, no masses palpable.  


PSYCH: Alert and oriented x3;  mood  and affect normal.


MUSCULOSKELETAL: Right ankle is less swollen compared to the left, some local 

tenderness  








INVESTIGATIONS, reviewed in the clinical context:


Culture results for the right ankle from Dr. Andrade's office show staph aureus


White count 8.2 hemoglobin 12.4 potassium 4.5 creatinine 0.81


C-reactive protein less than 5


X-ray right ankle-July soft tissue swelling and decrease the joint space


Pro calcitonin 0.02





Assessment:


-Acute right ankle septic arthritis growing staph aureus.  The joint is received

about 10 steroid injections


-Rheumatoid arthritis


-Hyperlipidemia


-Depression otherwise specified


-Chronic insomnia


-Obesity BMI 33.5





Disposition:


Home








Plan - Discharge Summary


New Discharge Prescriptions: 


New


   ceFAZolin [Kefzol] 2 gm IVP Q8HR #112 vial


   Nicotine 14Mg/24Hr Patch [Habitrol] 1 patch TRANSDERM DAILY #14 patch





Continue


   clonazePAM [KlonoPIN] 1 mg PO BID PRN


     PRN Reason: Anxiety


   buPROPion HCL [Wellbutrin XL] 300 mg PO DAILY


   Tofacitinib Citrate [Xeljanz Xr] 11 mg PO DAILY


   HYDROcodone/APAP 10-325MG [Norco ] 1 tab PO Q4HR PRN


     PRN Reason: Pain


   Escitalopram [Lexapro] 20 mg PO DAILY


   DULoxetine HCL [Cymbalta] See Taper PO BID


   Atorvastatin [Lipitor] 10 mg PO DAILY


   predniSONE See Taper PO BID


Discharge Medication List





Atorvastatin [Lipitor] 10 mg PO DAILY 02/03/20 [History]


DULoxetine HCL [Cymbalta] See Taper PO BID 02/03/20 [History]


Escitalopram [Lexapro] 20 mg PO DAILY 02/03/20 [History]


HYDROcodone/APAP 10-325MG [Norco ] 1 tab PO Q4HR PRN 02/03/20 [History]


Tofacitinib Citrate [Xeljanz Xr] 11 mg PO DAILY 02/03/20 [History]


buPROPion HCL [Wellbutrin XL] 300 mg PO DAILY 02/03/20 [History]


clonazePAM [KlonoPIN] 1 mg PO BID PRN 02/03/20 [History]


predniSONE See Taper PO BID 02/03/20 [History]


ceFAZolin [Kefzol] 2 gm IVP Q8HR #112 vial 02/05/20 [Rx]


Nicotine 14Mg/24Hr Patch [Habitrol] 1 patch TRANSDERM DAILY #14 patch 02/06/20 

[Rx]








Follow up Appointment(s)/Referral(s): 


[facility], [NON-STAFF] - 1 Week


[facility] [NON-STAFF] - 1 Week


Remington Mitchell DO [Primary Care Provider] - 02/11/20 9:30 am


Saad Silva MD [STAFF PHYSICIAN] - 02/25/20 10:30 am


Ambulatory/Diagnostic Orders: 


Basic Metabolic Panel [LAB.AMB] Location: None Selected


C Reactive Protein [LAB.AMB] Location: None Selected


Complete Blood Count w/diff [LAB.AMB] Location: None Selected


Erythrocyte Sedimentation Rate [LAB.AMB] Location: None Selected


Patient Instructions/Handouts:  Nicotine (Absorbed through the skin), Cefazolin 

(By injection), Septic Arthritis (DC)


Activity/Diet/Wound Care/Special Instructions: 


[facility] will deliver your supplies tonight - they will call you


[facility] will be out tonight to teach you how to infuse your antib

iotics - they will call you.


Discharge Disposition: HOME WITH HOME HEALTH SERVICES